# Patient Record
Sex: FEMALE | Race: WHITE | NOT HISPANIC OR LATINO | Employment: FULL TIME | URBAN - METROPOLITAN AREA
[De-identification: names, ages, dates, MRNs, and addresses within clinical notes are randomized per-mention and may not be internally consistent; named-entity substitution may affect disease eponyms.]

---

## 2024-07-02 ENCOUNTER — OFFICE VISIT (OUTPATIENT)
Dept: URGENT CARE | Facility: MEDICAL CENTER | Age: 69
End: 2024-07-02
Payer: MEDICARE

## 2024-07-02 VITALS
HEART RATE: 94 BPM | WEIGHT: 218 LBS | RESPIRATION RATE: 18 BRPM | TEMPERATURE: 99.1 F | SYSTOLIC BLOOD PRESSURE: 159 MMHG | OXYGEN SATURATION: 95 % | DIASTOLIC BLOOD PRESSURE: 72 MMHG

## 2024-07-02 DIAGNOSIS — U07.1 COVID: Primary | ICD-10-CM

## 2024-07-02 DIAGNOSIS — R06.09 DYSPNEA ON EXERTION: ICD-10-CM

## 2024-07-02 DIAGNOSIS — H66.91 RIGHT OTITIS MEDIA, UNSPECIFIED OTITIS MEDIA TYPE: ICD-10-CM

## 2024-07-02 DIAGNOSIS — Z76.0 ENCOUNTER FOR MEDICATION REFILL: ICD-10-CM

## 2024-07-02 PROBLEM — E55.9 VITAMIN D DEFICIENCY: Status: ACTIVE | Noted: 2021-03-09

## 2024-07-02 PROBLEM — R06.83 SNORING: Status: ACTIVE | Noted: 2021-10-19

## 2024-07-02 PROBLEM — K64.9 BLEEDING HEMORRHOIDS: Status: ACTIVE | Noted: 2021-03-09

## 2024-07-02 PROBLEM — E78.5 HYPERLIPIDEMIA: Status: ACTIVE | Noted: 2021-03-26

## 2024-07-02 PROBLEM — N62 BREAST HYPERTROPHY: Status: ACTIVE | Noted: 2021-10-13

## 2024-07-02 PROBLEM — G47.34 NOCTURNAL OXYGEN DESATURATION: Status: ACTIVE | Noted: 2020-09-29

## 2024-07-02 LAB
SARS-COV-2 AG UPPER RESP QL IA: POSITIVE
VALID CONTROL: ABNORMAL

## 2024-07-02 PROCEDURE — 99213 OFFICE O/P EST LOW 20 MIN: CPT

## 2024-07-02 PROCEDURE — 87811 SARS-COV-2 COVID19 W/OPTIC: CPT

## 2024-07-02 PROCEDURE — G0463 HOSPITAL OUTPT CLINIC VISIT: HCPCS

## 2024-07-02 RX ORDER — ALIROCUMAB 150 MG/ML
INJECTION, SOLUTION SUBCUTANEOUS
COMMUNITY

## 2024-07-02 RX ORDER — LEVETIRACETAM 500 MG/1
TABLET ORAL
COMMUNITY
Start: 2024-07-01

## 2024-07-02 RX ORDER — CYANOCOBALAMIN (VITAMIN B-12) 2500 MCG
5000 TABLET, SUBLINGUAL SUBLINGUAL
COMMUNITY

## 2024-07-02 RX ORDER — BENZONATATE 200 MG/1
200 CAPSULE ORAL 3 TIMES DAILY PRN
Qty: 30 CAPSULE | Refills: 0 | Status: SHIPPED | OUTPATIENT
Start: 2024-07-02

## 2024-07-02 RX ORDER — METOPROLOL SUCCINATE 25 MG/1
25 TABLET, EXTENDED RELEASE ORAL DAILY
COMMUNITY
End: 2024-07-02 | Stop reason: SDUPTHER

## 2024-07-02 RX ORDER — NIRMATRELVIR AND RITONAVIR 300-100 MG
3 KIT ORAL 2 TIMES DAILY
Qty: 30 TABLET | Refills: 0 | Status: SHIPPED | OUTPATIENT
Start: 2024-07-02 | End: 2024-07-07

## 2024-07-02 RX ORDER — AMOXICILLIN 875 MG/1
875 TABLET, COATED ORAL 2 TIMES DAILY
Qty: 14 TABLET | Refills: 0 | Status: SHIPPED | OUTPATIENT
Start: 2024-07-02 | End: 2024-07-09

## 2024-07-02 RX ORDER — CETIRIZINE HYDROCHLORIDE 10 MG/1
TABLET ORAL
COMMUNITY
Start: 2024-04-12

## 2024-07-02 RX ORDER — METOPROLOL SUCCINATE 25 MG/1
25 TABLET, EXTENDED RELEASE ORAL DAILY
Qty: 14 TABLET | Refills: 0 | Status: SHIPPED | OUTPATIENT
Start: 2024-07-02 | End: 2024-07-16

## 2024-07-02 RX ORDER — ALBUTEROL SULFATE 90 UG/1
2 AEROSOL, METERED RESPIRATORY (INHALATION) EVERY 6 HOURS PRN
Qty: 8.5 G | Refills: 0 | Status: SHIPPED | OUTPATIENT
Start: 2024-07-02

## 2024-07-02 NOTE — PATIENT INSTRUCTIONS
Irlanda presented with a right ear infection.     We will begin treatment with an oral antibiotic.  Take antibiotics as prescribed.   Take entire course of antibiotics.     Eat yogurt with live and active cultures and/or take a probiotic at least 3 hours before or after antibiotic dose.   Monitor stool for diarrhea and/or blood. If this occurs, contact primary care doctor ASAP.     Note that ear discomfort from fluid may persist for up to one month    Do not take PAXLOVID if:  You are allergic to nirmatrelvir, ritonavir, or any of the ingredients in PAXLOVID.     You are taking any of the following medicines:  - Alfuzosin  - Pethidine, piroxicam, propoxyphene  - Ranolazine  - Amiodarone, dronedarone, flecainide, propafenone, quinidine  - Colchicine  - Lurasidone, pimozide, clozapine  - Dihydroergotamine, ergotamine, methylergonovine  - Lovastatin, simvastatin  - Sildenafil (Revatio®) for pulmonary arterial hypertension (PAH)  - Triazolam, oral midazolam  - Apalutamide  - Carbamazepine, phenobarbital, phenytoin  - Rifampin  - [place]’s Wort (hypericum perforatum)    Most colds are caused by virus, which does not respond to antibiotics. Typically viruses are self limiting and will go away own their own. There are both over the counter medicines or prescriptions medicines that can be used to help with symptoms until the virus had a chance to run it's course.     Take Tessalon as prescribed  Vitamin D3 2000 IU daily. Get at least 30 minutes of sunshine and fresh air per day to help boost vitamin D.   Elderberry to help booster the immune system.   Vitamin C 1000mg twice per day  Multivitamin daily  Some studies suggest that Zinc 12.5-15mg every 2 hours while awake x 5 days may shorten the duration cold symptoms by 1-2 days.     Albuterol inhaler for any SOB or wheezing as discussed.    Encourage fluids  Rest  Nasal saline spray  Over the counter decongestant; Afrin if severe congestion (do not use for more than 3  days)  Tylenol/Ibuprofen for pain/fever  Salt water gargles and chloraseptic spray  Tsp of honey  Warm tea with honey. May use lemon.  Throat lozenges  Throat Coat Tea  Warm compresses over sinuses  Steam treatment (utilize proper safety precautions when in contact with hot water/steam)    May take Ibuprofen or Acetaminophen for any pain, discomfort or fever.     Follow up with PCP in 3-5 days.  Proceed to ER if symptoms worsen.    If tests are performed, our office will contact you with results only if changes need to made to the care plan discussed with you at the visit. You can review your full results on [facility].

## 2024-07-02 NOTE — PROGRESS NOTES
[facility]        NAME: Irlanda Murray is a 68 y.o. female  : 1955    MRN: [id number]  DATE: July 3, 2024  TIME: 8:25 PM    Assessment and Plan   COVID [U07.1]  1. COVID  Poct Covid 19 Rapid Antigen Test    nirmatrelvir & ritonavir (Paxlovid, 300/100,) tablet therapy pack    benzonatate (TESSALON) 200 MG capsule      2. Right otitis media, unspecified otitis media type  amoxicillin (AMOXIL) 875 mg tablet      3. Dyspnea on exertion  albuterol (ProAir HFA) 90 mcg/act inhaler      4. Encounter for medication refill  metoprolol succinate (TOPROL-XL) 25 mg 24 hr tablet        POCT COVID 19 Rapid Antigen Test: Positive    Patient Instructions   Irlanda presented with a right ear infection.     We will begin treatment with an oral antibiotic.  Take antibiotics as prescribed.   Take entire course of antibiotics.     Eat yogurt with live and active cultures and/or take a probiotic at least 3 hours before or after antibiotic dose.   Monitor stool for diarrhea and/or blood. If this occurs, contact primary care doctor ASAP.     Note that ear discomfort from fluid may persist for up to one month    Do not take PAXLOVID if:  You are allergic to nirmatrelvir, ritonavir, or any of the ingredients in PAXLOVID.     You are taking any of the following medicines:  - Alfuzosin  - Pethidine, piroxicam, propoxyphene  - Ranolazine  - Amiodarone, dronedarone, flecainide, propafenone, quinidine  - Colchicine  - Lurasidone, pimozide, clozapine  - Dihydroergotamine, ergotamine, methylergonovine  - Lovastatin, simvastatin  - Sildenafil (Revatio®) for pulmonary arterial hypertension (PAH)  - Triazolam, oral midazolam  - Apalutamide  - Carbamazepine, phenobarbital, phenytoin  - Rifampin  - [place]’s Wort (hypericum perforatum)    Most colds are caused by virus, which does not respond to antibiotics. Typically viruses are self limiting and will go away own their own. There are both over the counter medicines or  prescriptions medicines that can be used to help with symptoms until the virus had a chance to run it's course.     Take Tessalon as prescribed  Vitamin D3 2000 IU daily. Get at least 30 minutes of sunshine and fresh air per day to help boost vitamin D.   Elderberry to help booster the immune system.   Vitamin C 1000mg twice per day  Multivitamin daily  Some studies suggest that Zinc 12.5-15mg every 2 hours while awake x 5 days may shorten the duration cold symptoms by 1-2 days.     Albuterol inhaler for any SOB or wheezing as discussed.    Encourage fluids  Rest  Nasal saline spray  Over the counter decongestant; Afrin if severe congestion (do not use for more than 3 days)  Tylenol/Ibuprofen for pain/fever  Salt water gargles and chloraseptic spray  Tsp of honey  Warm tea with honey. May use lemon.  Throat lozenges  Throat Coat Tea  Warm compresses over sinuses  Steam treatment (utilize proper safety precautions when in contact with hot water/steam)    May take Ibuprofen or Acetaminophen for any pain, discomfort or fever.     Follow up with PCP in 3-5 days.  Proceed to ER if symptoms worsen.    If tests are performed, our office will contact you with results only if changes need to made to the care plan discussed with you at the visit. You can review your full results on [facility].    Chief Complaint     Chief Complaint   Patient presents with    COVID-19     Pt. With cough, congestion, post nasal drip, dry throat, fatigue that began yesterday. She tested positive for Covid at home. She has had recent travel on a plane.      History of Present Illness       Pt is a 67 y/o F who presents to the clinic with cold like symptoms.     Pt reports she has been flying between home, in [place], and [place] d/t her Mother being ill, hospitalized and on comfort measures. Pt developed cold like symptoms, took a home COVID test which was positive, and present to the clinic for confirmation of COVID. Pt expresses  concern that she will not be able to visit her Mother in the hospital if she is COVID positive.    Pt states that she forgot to bring her at home medication with her for her BP. Pt requesting a short refill for BP medication.     URI   This is a new problem. The current episode started yesterday. The problem has been gradually worsening. There has been no fever. Associated symptoms include congestion, coughing, ear pain, a plugged ear sensation (Pt reports her left ear popped yesterday after getting off the plane, but her R ear has not popped and she has been having worsening R ear pain) and rhinorrhea. Pertinent negatives include no abdominal pain, chest pain, diarrhea, headaches, nausea, sinus pain, sore throat, vomiting or wheezing.       Review of Systems   Review of Systems   Constitutional:  Positive for fatigue. Negative for chills, diaphoresis and fever.   HENT:  Positive for congestion, ear pain, postnasal drip and rhinorrhea. Negative for ear discharge, sinus pressure, sinus pain and sore throat.         Dry throat   Respiratory:  Positive for cough and shortness of breath (with exertion). Negative for wheezing.    Cardiovascular: Negative.  Negative for chest pain and palpitations.   Gastrointestinal:  Negative for abdominal pain, constipation, diarrhea, nausea and vomiting.   Musculoskeletal:  Negative for myalgias.   Skin:  Negative for color change and wound.   Neurological:  Negative for headaches.     Current Medications       Current Outpatient Medications:     albuterol (ProAir HFA) 90 mcg/act inhaler, Inhale 2 puffs every 6 (six) hours as needed for wheezing or shortness of breath, Disp: 8.5 g, Rfl: 0    amoxicillin (AMOXIL) 875 mg tablet, Take 1 tablet (875 mg total) by mouth 2 (two) times a day for 7 days, Disp: 14 tablet, Rfl: 0    ascorbic acid (VITAMIN C) 1000 MG TBCR, Take 1,000 mg by mouth, Disp: , Rfl:     benzonatate (TESSALON) 200 MG capsule, Take 1 capsule (200 mg total) by mouth 3  (three) times a day as needed for cough, Disp: 30 capsule, Rfl: 0    Biotin 5000 MCG SUBL, Place 5,000 mcg under the tongue, Disp: , Rfl:     Calcium Citrate-Vitamin D (CALCIUM CITRATE + PO), Take 1,200 mg by mouth, Disp: , Rfl:     cetirizine (ZyrTEC) 10 mg tablet, , Disp: , Rfl:     levETIRAcetam (KEPPRA) 500 mg tablet, TAKE 1 TABLET BY MOUTH TWICE DAILY FOR SEIZURES, Disp: , Rfl:     metoprolol succinate (TOPROL-XL) 25 mg 24 hr tablet, Take 1 tablet (25 mg total) by mouth daily for 14 days, Disp: 14 tablet, Rfl: 0    Multiple Vitamin (MULTIVITAMIN PO), Take 1 capsule by mouth daily, Disp: , Rfl:     nirmatrelvir & ritonavir (Paxlovid, 300/100,) tablet therapy pack, Take 3 tablets by mouth 2 (two) times a day for 5 days Take 2 nirmatrelvir tablets + 1 ritonavir tablet together per dose, Disp: 30 tablet, Rfl: 0    Omega-3 Fatty Acids (FISH OIL PO), Take 1,500 mg by mouth, Disp: , Rfl:     Praluent 150 MG/ML SOAJ, ADMINISTER 1 ML UNDER THE SKIN EVERY 14 DAYS, Disp: , Rfl:     VITAMIN D PO, Take 1,000 Units by mouth, Disp: , Rfl:     Current Allergies     Allergies as of 07/02/2024 - Reviewed 07/02/2024   Allergen Reaction Noted    Ciprofloxacin Other (See Comments) 09/29/2020    Doxycycline Hives 07/17/2020    Fluconazole Hives 07/17/2020    Sulfa antibiotics Hives 07/17/2020    Sulfamethoxazole-trimethoprim Hives 07/17/2020            The following portions of the patient's history were reviewed and updated as appropriate: allergies, current medications, past family history, past medical history, past social history, past surgical history and problem list.     Past Medical History:   Diagnosis Date    Allergic rhinitis     Hyperlipidemia     Hypertension     Seizures (HCC)        Past Surgical History:   Procedure Laterality Date    HYSTERECTOMY      REDUCTION MAMMAPLASTY         No family history on file.      Medications have been verified.        Objective   /72   Pulse 94   Temp 99.1 °F (37.3 °C)    Resp 18   Wt 98.9 kg (218 lb)   SpO2 95%        Physical Exam     Physical Exam  Vitals and nursing note reviewed.   Constitutional:       General: She is not in acute distress.     Appearance: Normal appearance. She is not ill-appearing, toxic-appearing or diaphoretic.   HENT:      Head: Normocephalic.      Right Ear: A middle ear effusion is present. Tympanic membrane is erythematous and bulging.      Left Ear: No drainage, swelling or tenderness.  No middle ear effusion. There is no impacted cerumen. Tympanic membrane is not injected, erythematous, retracted or bulging.      Nose: Congestion and rhinorrhea present.      Mouth/Throat:      Mouth: Mucous membranes are moist.      Pharynx: Posterior oropharyngeal erythema present.   Cardiovascular:      Rate and Rhythm: Normal rate and regular rhythm.      Pulses: Normal pulses.      Heart sounds: Normal heart sounds. No murmur heard.  Pulmonary:      Effort: Pulmonary effort is normal. No respiratory distress.      Breath sounds: Normal breath sounds. No stridor. No wheezing, rhonchi or rales.   Chest:      Chest wall: No tenderness.   Musculoskeletal:         General: Normal range of motion.   Skin:     General: Skin is warm.   Neurological:      Mental Status: She is alert.